# Patient Record
Sex: MALE | Race: WHITE | Employment: FULL TIME
[De-identification: names, ages, dates, MRNs, and addresses within clinical notes are randomized per-mention and may not be internally consistent; named-entity substitution may affect disease eponyms.]

---

## 2020-03-10 ENCOUNTER — HEALTH MAINTENANCE LETTER (OUTPATIENT)
Age: 40
End: 2020-03-10

## 2020-12-27 ENCOUNTER — HEALTH MAINTENANCE LETTER (OUTPATIENT)
Age: 40
End: 2020-12-27

## 2021-04-24 ENCOUNTER — HEALTH MAINTENANCE LETTER (OUTPATIENT)
Age: 41
End: 2021-04-24

## 2021-10-04 ENCOUNTER — HEALTH MAINTENANCE LETTER (OUTPATIENT)
Age: 41
End: 2021-10-04

## 2022-05-15 ENCOUNTER — HEALTH MAINTENANCE LETTER (OUTPATIENT)
Age: 42
End: 2022-05-15

## 2022-09-11 ENCOUNTER — HEALTH MAINTENANCE LETTER (OUTPATIENT)
Age: 42
End: 2022-09-11

## 2022-11-03 DIAGNOSIS — Z31.41 FERTILITY TESTING: Primary | ICD-10-CM

## 2023-01-30 ENCOUNTER — LAB (OUTPATIENT)
Dept: LAB | Facility: CLINIC | Age: 43
End: 2023-01-30
Payer: COMMERCIAL

## 2023-01-30 DIAGNOSIS — Z31.41 FERTILITY TESTING: ICD-10-CM

## 2023-01-30 PROCEDURE — 89322 SEMEN ANAL STRICT CRITERIA: CPT

## 2023-01-31 LAB
ABNORMAL SPERM MORPHOLOGY: 96
ABSTINENCE DAYS: 6 DAYS (ref 2–7)
AGGLUTINATION: NO
ANALYSIS TEMP - CENTIGRADE: 20 CENTIGRADE
COLLECTION METHOD: NORMAL
COLLECTION SITE: NORMAL
CONSENT TO RELEASE TO PARTNER: YES
DAL- RECEIVED TIME: NORMAL
HEAD DEFECT: 96 %
IMMOTILE: 47 %
LIQUEFIED: YES
MIDPIECE DEFECT: 42 %
NON-PROGRESSIVE MOTILITY: 5 %
NORMAL SPERM MORPHOLOGY: 4 % NORMAL FORMS
PROGRESSIVE MOTILITY: 48 %
ROUND CELLS: 0.4 MILLION/ML
SPECIMEN PH: 7.2 PH
SPECIMEN VOLUME: 4.5 ML
SPERM CONCENTRATION: 75.8 MILLION/ML
TAIL DEFECT: 6 %
TIME OF ANALYSIS: NORMAL
TOTAL PROGRESSIVE MOTILE NUMBER: 164 MILLION
TOTAL SPERM NUMBER: 341 MILLION
VISCOUS: NO
VITALITY: NORMAL

## 2023-06-03 ENCOUNTER — HEALTH MAINTENANCE LETTER (OUTPATIENT)
Age: 43
End: 2023-06-03

## 2024-07-13 ENCOUNTER — HEALTH MAINTENANCE LETTER (OUTPATIENT)
Age: 44
End: 2024-07-13

## 2025-03-06 ENCOUNTER — DOCUMENTATION ONLY (OUTPATIENT)
Dept: MATERNAL FETAL MEDICINE | Facility: CLINIC | Age: 45
End: 2025-03-06

## 2025-03-06 ENCOUNTER — LAB (OUTPATIENT)
Dept: LAB | Facility: CLINIC | Age: 45
End: 2025-03-06
Payer: COMMERCIAL

## 2025-03-06 DIAGNOSIS — Z31.440 ENCOUNTER OF MALE FOR TESTING FOR GENETIC DISEASE CARRIER STATUS FOR PROCREATIVE MANAGEMENT: Primary | ICD-10-CM

## 2025-03-06 DIAGNOSIS — Z31.440 ENCOUNTER OF MALE FOR TESTING FOR GENETIC DISEASE CARRIER STATUS FOR PROCREATIVE MANAGEMENT: ICD-10-CM

## 2025-03-06 LAB
PERFORMING LABORATORY: NORMAL
SPECIMEN STATUS: NORMAL
TEST NAME: NORMAL

## 2025-03-06 PROCEDURE — 36415 COLL VENOUS BLD VENIPUNCTURE: CPT

## 2025-03-11 NOTE — CONFIDENTIAL NOTE
[facility] [place]  Genetic Counseling Consult    Patient:  Hugo Hernandez YOB: 1980   Date of Service:  03/6/25      Hugo accompanied his partner, Helen to her appointment at the [facility] for genetic consultation.        Impression/Plan:   1. Hugo and Helen elected to do whole exome sequencing (OSMAN) on amniocentesis today with parental samples.        Prenatal Whole Exome Sequencing (OSMAN) allows us to analyze the exons (protein-coding regions), but not the introns (non-coding regions) of most of our genes, using the ultrasound findings as a guide. OSMAN differs from a multi-gene panel as the lab reports on the variants with the best fit for the clinical indication instead of the clinician picking a group of genes they believe to be the best fit. The SADAR 3D prenatal whole exome also includes exome-wide copy number variant analysis and is validated to identify aneuploidy, triploidy, and known microdeletions and microduplications. As many insurance companies will not cover OSMAN unless a microarray has been performed, microarray will be run concurrently. Benefits include finding an underlying genetic etiology for the ultrasound findings, streamlining medical care after delivery if the pregnancy is ongoing, and helping to provide reproductive planning information for the family. Challenges include the high rate of uncertain results, receiving unexpected results for which a family is unprepared (this is likely a low chance), and the continued possibility of a genetic etiology even with negative results. Additionally, there are unique limitations with whole exome sequencing, such as its inability to detect repeat expansion disorders.    SADAR 3D offers the option to report on any pathogenic or likely pathogenic variants detected in genes unrelated to the fetal clinical features, but that are associated with  moderate to severe childhood onset disorders. Many of these disorders, especially those associated with nonsyndromic intellectual disability, neurodevelopmental disorders, and metabolic conditions may not present with ultrasound anomalies. If the family opts in to childhood onset conditions, they will be reported as secondary findings and only pathogenic or likely pathogenic variants will be reported. aDrnell opted in.  [facility] can also include analysis of the recommended ACMG secondary findings. Mutations in these genes are not expected to have overlap with the prenatal phenotype.  Darnell opted in.   [facility] will report on variants in genes known to be associated with the phenotype and variants in genes possibly associated with the phenotype as primary findings. We discussed autosomal recessive, autosomal dominant, and x-linked inheritance patterns. We discussed the types of results that could be obtained:  Negative: No disease-causing variants were identified through whole exome sequencing. A negative result would not completely rule out a possible genetic cause for the clinical presentation in the pregnancy.  We reviewed that ES will not look at every part of the genome that can cause disease.  In addition, not all the exons that are targeted by OSMAN will be covered or evaluated at a high enough level to accurately detect a disease-causing mutation.  There are also limits to the types of disease-causing gene mutations that OSMAN can detect.  It is possible that a genetic cause exists for the prenatal presentation but is not detected by this test.   Positive: A pathogenic or likely pathogenic variant or variants were identified in a gene or genes through whole exome sequencing. This type of result provides us with an answer and can also guide conversations about recurrence risk.   Variant of uncertain significance (VUS): A variant was found in one or multiple genes through whole  "exome sequencing, but the lab does not yet have enough information to classify this variant/these variants as pathogenic or benign (not disease causing). We reviewed that identifying a VUS is more likely through OSMAN than other analyses and can add additional uncertainty, which can be distressing for a family.    We discussed that samples for Helen and Hugo will be included in the analysis to help determine if genetic changes that are found are disease-causing mutations or benign variation, and to prove phase. Only changes that are identified in the fetus will be tested for in Helen and Hugo. Variants associated with childhood onset disorders will be reported by parent of origin. We reviewed that it is possible we could identify a variant in the fetus that, if detected in Helen or Hugo, could have implications for their own health. We also discussed that sometimes findings warrant additional testing.       Genetic Information Nondiscrimination Act  There is a federal law in place, The Genetic Information Nondiscrimination Act or HUY (2008), that protects against health insurance and employment discrimination.  Health insurance protections do not apply to members of the [place]  who receive care through [employer], veterans receiving care through the [facility], the [facility], or federal employees who receive care through Federal Employees Health Benefits Plan. Employers may not discriminate (hiring, firing, promotions etc.), based on genetic information. This only applies to companies with 15 or more employees. It does not apply to federal employees, or , which have their own nondiscrimination protections in place. Employers may have \"voluntary\" health services such as employee wellness programs that request genetic information or family history, which is not a violation of HUY. We discussed that there can be insurance implications related to these findings in terms of life, short-term disability, " and long-term disability insurance.  It was a pleasure to be involved with Hugo's care.     CANDIDO SETHI MS, [facility]  Genetic Counselor   [facility]  Maternal Fetal Medicine  Office: [phone]   [facility]: [phone]   Fax: [phone]  [facility]

## 2025-04-30 LAB
SCANNED LAB RESULT: NORMAL
TEST NAME: NORMAL

## 2025-07-19 ENCOUNTER — HEALTH MAINTENANCE LETTER (OUTPATIENT)
Age: 45
End: 2025-07-19